# Patient Record
Sex: MALE | Race: WHITE | Employment: OTHER | URBAN - METROPOLITAN AREA
[De-identification: names, ages, dates, MRNs, and addresses within clinical notes are randomized per-mention and may not be internally consistent; named-entity substitution may affect disease eponyms.]

---

## 2018-11-16 ENCOUNTER — HOSPITAL ENCOUNTER (OUTPATIENT)
Dept: PHARMACY | Age: 68
Setting detail: THERAPIES SERIES
Discharge: HOME | End: 2018-11-16
Payer: MEDICARE

## 2018-11-16 DIAGNOSIS — I26.99 OTHER PULMONARY EMBOLISM WITHOUT ACUTE COR PULMONALE, UNSPECIFIED CHRONICITY (HCC): ICD-10-CM

## 2018-11-16 LAB
INR BLD: 5.1
PROTIME: 61.2 SECONDS

## 2018-11-16 PROCEDURE — 85610 PROTHROMBIN TIME: CPT

## 2018-11-16 PROCEDURE — 99213 OFFICE O/P EST LOW 20 MIN: CPT

## 2018-11-16 NOTE — PROGRESS NOTES
First visit to [facility] Office. Education provided on indication and mechanism of warfarin, compliance, appropriate follow-up & monitoring, dietary and medication interactions, potential thromboembolic & bleeding complications, when to seek medical care, and office policy. Patient acknowledges working in consult agreement with pharmacist as referred by his/her physician. Patient states he has been alternating 7.5mg and 5mg most of the time. Recently his INR was low so Dr had him take 10mg daily which resulted in INR 4.2 two Friday ago. He has since resumed normal dosing but did note having significant alcohol since the last INR result and now. He notes he typically drinking 2-3 martinits on the weekend when he goes out but is not a daily drinker. He also cooks a lot at home and is aware of the high vitamin K containing foods. No bleeding or thromboembolic side effects noted. No significant recent illness or disease state changes. PT/INR done in office per protocol. INR is 5.1 which is supratherapeutic. Warfarin regimen will be held for 2 days then continue 5mg daily. Will retest in 5 days to assess. Will look to dose patient on weekly regimen (instead of alternating) and patient is agreeable. Patient understands dosing directions and information discussed. Dosing schedule and follow up appointment given to patient. Progress note routed to referring physicians office.

## 2018-11-21 ENCOUNTER — HOSPITAL ENCOUNTER (OUTPATIENT)
Dept: PHARMACY | Age: 68
Setting detail: THERAPIES SERIES
Discharge: HOME | End: 2018-11-21
Payer: MEDICARE

## 2018-11-21 DIAGNOSIS — I26.99 OTHER PULMONARY EMBOLISM WITHOUT ACUTE COR PULMONALE, UNSPECIFIED CHRONICITY (HCC): ICD-10-CM

## 2018-11-21 LAB
INR BLD: 2
PROTIME: 24.1 SECONDS

## 2018-11-21 PROCEDURE — 85610 PROTHROMBIN TIME: CPT

## 2018-11-21 PROCEDURE — 99211 OFF/OP EST MAY X REQ PHY/QHP: CPT

## 2018-11-28 ENCOUNTER — HOSPITAL ENCOUNTER (OUTPATIENT)
Dept: PHARMACY | Age: 68
Setting detail: THERAPIES SERIES
Discharge: HOME | End: 2018-11-28
Payer: MEDICARE

## 2018-11-28 DIAGNOSIS — I26.99 OTHER PULMONARY EMBOLISM WITHOUT ACUTE COR PULMONALE, UNSPECIFIED CHRONICITY (HCC): ICD-10-CM

## 2018-11-28 LAB
INR BLD: 3.1
PROTIME: 37 SECONDS

## 2018-11-28 PROCEDURE — 85610 PROTHROMBIN TIME: CPT

## 2018-11-28 PROCEDURE — 99211 OFF/OP EST MAY X REQ PHY/QHP: CPT

## 2018-12-05 ENCOUNTER — HOSPITAL ENCOUNTER (OUTPATIENT)
Dept: PHARMACY | Age: 68
Setting detail: THERAPIES SERIES
Discharge: HOME | End: 2018-12-05
Payer: MEDICARE

## 2018-12-05 DIAGNOSIS — I26.99 OTHER PULMONARY EMBOLISM WITHOUT ACUTE COR PULMONALE, UNSPECIFIED CHRONICITY (HCC): ICD-10-CM

## 2018-12-05 LAB
INR BLD: 4.4
PROTIME: 53.2 SECONDS

## 2018-12-05 PROCEDURE — 85610 PROTHROMBIN TIME: CPT

## 2018-12-05 PROCEDURE — 99212 OFFICE O/P EST SF 10 MIN: CPT

## 2018-12-12 ENCOUNTER — HOSPITAL ENCOUNTER (OUTPATIENT)
Dept: PHARMACY | Age: 68
Setting detail: THERAPIES SERIES
Discharge: HOME | End: 2018-12-12
Payer: MEDICARE

## 2018-12-12 DIAGNOSIS — I26.99 OTHER PULMONARY EMBOLISM WITHOUT ACUTE COR PULMONALE, UNSPECIFIED CHRONICITY (HCC): ICD-10-CM

## 2018-12-12 LAB
INR BLD: 2.9
PROTIME: 34.4 SECONDS

## 2018-12-12 PROCEDURE — 85610 PROTHROMBIN TIME: CPT

## 2018-12-12 PROCEDURE — 99211 OFF/OP EST MAY X REQ PHY/QHP: CPT

## 2018-12-12 RX ORDER — ALFUZOSIN HYDROCHLORIDE 10 MG/1
10 TABLET, EXTENDED RELEASE ORAL DAILY
COMMUNITY

## 2018-12-12 RX ORDER — ATORVASTATIN CALCIUM 40 MG/1
40 TABLET, FILM COATED ORAL DAILY
COMMUNITY
Start: 2017-09-01

## 2018-12-12 RX ORDER — ALPRAZOLAM 0.5 MG/1
0.5 TABLET ORAL DAILY
COMMUNITY

## 2018-12-12 RX ORDER — ALLOPURINOL 300 MG/1
300 TABLET ORAL DAILY
COMMUNITY

## 2018-12-12 RX ORDER — WARFARIN SODIUM 5 MG/1
5 TABLET ORAL DAILY
COMMUNITY

## 2018-12-19 ENCOUNTER — HOSPITAL ENCOUNTER (OUTPATIENT)
Dept: PHARMACY | Age: 68
Setting detail: THERAPIES SERIES
Discharge: HOME | End: 2018-12-19
Payer: MEDICARE

## 2018-12-19 DIAGNOSIS — I26.99 OTHER PULMONARY EMBOLISM WITHOUT ACUTE COR PULMONALE, UNSPECIFIED CHRONICITY (HCC): ICD-10-CM

## 2018-12-19 LAB
INR BLD: 2.4
PROTIME: 28.6 SECONDS

## 2018-12-19 PROCEDURE — 85610 PROTHROMBIN TIME: CPT

## 2018-12-19 PROCEDURE — 99211 OFF/OP EST MAY X REQ PHY/QHP: CPT

## 2018-12-19 RX ORDER — BUPROPION HYDROCHLORIDE 150 MG/1
150 TABLET ORAL EVERY MORNING
COMMUNITY

## 2018-12-19 RX ORDER — FENOFIBRATE 145 MG/1
145 TABLET, COATED ORAL DAILY
COMMUNITY

## 2018-12-19 RX ORDER — ASPIRIN 81 MG/1
81 TABLET ORAL DAILY
COMMUNITY

## 2018-12-19 RX ORDER — GABAPENTIN 800 MG/1
800 TABLET ORAL 2 TIMES DAILY
COMMUNITY

## 2018-12-19 RX ORDER — MEMANTINE HYDROCHLORIDE 14 MG/1
14 CAPSULE, EXTENDED RELEASE ORAL DAILY
COMMUNITY

## 2018-12-19 RX ORDER — ZOLPIDEM TARTRATE 5 MG/1
5 TABLET ORAL NIGHTLY PRN
COMMUNITY
End: 2019-01-09 | Stop reason: ALTCHOICE

## 2018-12-19 RX ORDER — FINASTERIDE 5 MG/1
5 TABLET, FILM COATED ORAL DAILY
COMMUNITY

## 2018-12-19 RX ORDER — MAGNESIUM OXIDE 400 MG/1
400 TABLET ORAL DAILY
COMMUNITY

## 2018-12-19 RX ORDER — LISINOPRIL 10 MG/1
10 TABLET ORAL DAILY
COMMUNITY
End: 2019-01-09 | Stop reason: ALTCHOICE

## 2018-12-19 RX ORDER — THIAMINE MONONITRATE (VIT B1) 100 MG
100 TABLET ORAL DAILY
COMMUNITY

## 2018-12-19 RX ORDER — ISOSORBIDE DINITRATE 10 MG/1
10 TABLET ORAL DAILY
COMMUNITY

## 2018-12-19 RX ORDER — PANTOPRAZOLE SODIUM 40 MG/1
40 TABLET, DELAYED RELEASE ORAL 2 TIMES DAILY
COMMUNITY

## 2018-12-19 RX ORDER — M-VIT,TX,IRON,MINS/CALC/FOLIC 27MG-0.4MG
1 TABLET ORAL DAILY
COMMUNITY
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
## 2019-01-02 ENCOUNTER — HOSPITAL ENCOUNTER (OUTPATIENT)
Dept: PHARMACY | Age: 69
Setting detail: THERAPIES SERIES
Discharge: HOME | End: 2019-01-02
Payer: MEDICARE

## 2019-01-02 DIAGNOSIS — I26.99 OTHER PULMONARY EMBOLISM WITHOUT ACUTE COR PULMONALE, UNSPECIFIED CHRONICITY (HCC): ICD-10-CM

## 2019-01-02 LAB
INR BLD: 1.3
PROTIME: 15.9 SECONDS

## 2019-01-02 PROCEDURE — 99212 OFFICE O/P EST SF 10 MIN: CPT

## 2019-01-02 PROCEDURE — 85610 PROTHROMBIN TIME: CPT

## 2019-01-04 ENCOUNTER — APPOINTMENT (OUTPATIENT)
Dept: PHARMACY | Age: 69
End: 2019-01-04
Payer: MEDICARE

## 2019-01-09 ENCOUNTER — HOSPITAL ENCOUNTER (OUTPATIENT)
Dept: PHARMACY | Age: 69
Setting detail: THERAPIES SERIES
Discharge: HOME | End: 2019-01-09
Payer: MEDICARE

## 2019-01-09 DIAGNOSIS — I26.99 OTHER PULMONARY EMBOLISM WITHOUT ACUTE COR PULMONALE, UNSPECIFIED CHRONICITY (HCC): ICD-10-CM

## 2019-01-09 LAB
INR BLD: 2.4
PROTIME: 29.3 SECONDS

## 2019-01-09 PROCEDURE — 85610 PROTHROMBIN TIME: CPT

## 2019-01-09 PROCEDURE — 99211 OFF/OP EST MAY X REQ PHY/QHP: CPT

## 2019-01-09 RX ORDER — DICLOFENAC SODIUM 75 MG/1
75 TABLET, DELAYED RELEASE ORAL 2 TIMES DAILY
COMMUNITY
End: 2019-01-16 | Stop reason: ALTCHOICE

## 2019-01-09 RX ORDER — RIVASTIGMINE TARTRATE 1.5 MG/1
1.5 CAPSULE ORAL 2 TIMES DAILY
COMMUNITY

## 2019-01-09 RX ORDER — BACLOFEN 10 MG/1
10 TABLET ORAL 3 TIMES DAILY PRN
COMMUNITY
End: 2019-01-16 | Stop reason: ALTCHOICE

## 2019-01-09 RX ORDER — PREDNISONE 1 MG/1
5 TABLET ORAL SEE ADMIN INSTRUCTIONS
COMMUNITY
End: 2019-01-16 | Stop reason: ALTCHOICE

## 2019-01-16 ENCOUNTER — HOSPITAL ENCOUNTER (OUTPATIENT)
Dept: PHARMACY | Age: 69
Setting detail: THERAPIES SERIES
Discharge: HOME | End: 2019-01-16
Payer: MEDICARE

## 2019-01-16 DIAGNOSIS — I26.99 OTHER PULMONARY EMBOLISM WITHOUT ACUTE COR PULMONALE, UNSPECIFIED CHRONICITY (HCC): ICD-10-CM

## 2019-01-16 LAB
INR BLD: 3
PROTIME: 36.6 SECONDS

## 2019-01-16 PROCEDURE — 99211 OFF/OP EST MAY X REQ PHY/QHP: CPT

## 2019-01-16 PROCEDURE — 85610 PROTHROMBIN TIME: CPT

## 2019-01-23 ENCOUNTER — HOSPITAL ENCOUNTER (OUTPATIENT)
Dept: PHARMACY | Age: 69
Setting detail: THERAPIES SERIES
Discharge: HOME | End: 2019-01-23
Payer: MEDICARE

## 2019-01-23 DIAGNOSIS — I26.99 OTHER PULMONARY EMBOLISM WITHOUT ACUTE COR PULMONALE, UNSPECIFIED CHRONICITY (HCC): ICD-10-CM

## 2019-01-23 LAB
INR BLD: 3.3
PROTIME: 39 SECONDS

## 2019-01-23 PROCEDURE — 99212 OFFICE O/P EST SF 10 MIN: CPT

## 2019-01-23 PROCEDURE — 85610 PROTHROMBIN TIME: CPT

## 2019-01-30 ENCOUNTER — APPOINTMENT (OUTPATIENT)
Dept: PHARMACY | Age: 69
End: 2019-01-30
Payer: MEDICARE

## 2019-01-30 ENCOUNTER — TELEPHONE (OUTPATIENT)
Dept: PHARMACY | Age: 69
End: 2019-01-30

## 2019-01-30 DIAGNOSIS — I26.99 OTHER PULMONARY EMBOLISM WITHOUT ACUTE COR PULMONALE, UNSPECIFIED CHRONICITY (HCC): ICD-10-CM

## 2019-09-13 PROBLEM — I26.99 PULMONARY EMBOLISM (HCC): Status: RESOLVED | Noted: 2018-11-16 | Resolved: 2019-09-13

## 2020-09-14 ENCOUNTER — HOSPITAL ENCOUNTER (EMERGENCY)
Facility: CLINIC | Age: 70
Discharge: HOME | End: 2020-09-14
Attending: EMERGENCY MEDICINE
Payer: MEDICARE

## 2020-09-14 VITALS
WEIGHT: 170 LBS | TEMPERATURE: 97.8 F | HEART RATE: 72 BPM | OXYGEN SATURATION: 97 % | SYSTOLIC BLOOD PRESSURE: 133 MMHG | DIASTOLIC BLOOD PRESSURE: 72 MMHG | RESPIRATION RATE: 16 BRPM

## 2020-09-14 PROCEDURE — 99282 EMERGENCY DEPT VISIT SF MDM: CPT

## 2020-09-14 ASSESSMENT — PAIN DESCRIPTION - LOCATION: LOCATION: EAR

## 2020-09-14 ASSESSMENT — PAIN DESCRIPTION - ORIENTATION: ORIENTATION: LEFT

## 2020-09-14 ASSESSMENT — PAIN DESCRIPTION - PAIN TYPE: TYPE: ACUTE PAIN

## 2020-09-14 ASSESSMENT — PAIN SCALES - GENERAL: PAINLEVEL_OUTOF10: 3

## 2020-09-16 ASSESSMENT — ENCOUNTER SYMPTOMS
COUGH: 0
SHORTNESS OF BREATH: 0

## 2020-09-16 NOTE — ED PROVIDER NOTES
tablet Take 81 mg by mouth dailyHistorical Med      vitamin D (CHOLECALCIFEROL) 1000 UNIT TABS tablet Take 1,000 Units by mouth dailyHistorical Med      fenofibrate (TRICOR) 145 MG tablet Take 145 mg by mouth dailyHistorical Med      finasteride (PROSCAR) 5 MG tablet Take 5 mg by mouth dailyHistorical Med      gabapentin (NEURONTIN) 800 MG tablet Take 800 mg by mouth 2 times daily. Delwyn Scarce Historical Med      isosorbide dinitrate (ISORDIL) 10 MG tablet Take 10 mg by mouth dailyHistorical Med      magnesium oxide (MAG-OX) 400 MG tablet Take 400 mg by mouth daily Historical Med      pantoprazole (PROTONIX) 40 MG tablet Take 40 mg by mouth 2 times daily Historical Med      sertraline (ZOLOFT) 50 MG tablet Take 75 mg by mouth dailyHistorical Med      vitamin B-1 (THIAMINE) 100 MG tablet Take 100 mg by mouth dailyHistorical Med      buPROPion (WELLBUTRIN XL) 150 MG extended release tablet Take 150 mg by mouth every morning Historical Med      Multiple Vitamins-Minerals (PRESERVISION AREDS 2+MULTI VIT PO) Take 1 tablet by mouth 2 times dailyHistorical Med      Multiple Vitamins-Minerals (THERAPEUTIC MULTIVITAMIN-MINERALS) tablet Take 1 tablet by mouth dailyHistorical Med      alfuzosin (UROXATRAL) 10 MG extended release tablet Take 10 mg by mouth dailyHistorical Med      allopurinol (ZYLOPRIM) 300 MG tablet Take 300 mg by mouth dailyHistorical Med      ALPRAZolam (XANAX) 0.5 MG tablet Take 0.5 mg by mouth daily. Delwyn Scarce Historical Med      atorvastatin (LIPITOR) 40 MG tablet Take 40 mg by mouth daily Historical Med      warfarin (COUMADIN) 5 MG tablet Take 5 mg by mouth daily Take one tablet by mouth once daily as directed by [facility]. See anticoagulation episode for current dosing instructions. Historical Med             ALLERGIES     Duricef [cefadroxil]    FAMILY HISTORY     History reviewed. No pertinent family history.        SOCIAL HISTORY       Social History     Socioeconomic History    Marital status:      Spouse name: None    Number of children: None    Years of education: None    Highest education level: None   Occupational History    None   Social Needs    Financial resource strain: None    Food insecurity     Worry: None     Inability: None    Transportation needs     Medical: None     Non-medical: None   Tobacco Use    Smoking status: Never Smoker   Substance and Sexual Activity    Alcohol use: Yes     Comment: socially     Drug use: Never    Sexual activity: None   Lifestyle    Physical activity     Days per week: None     Minutes per session: None    Stress: None   Relationships    Social connections     Talks on phone: None     Gets together: None     Attends [employer] service: None     Active member of club or organization: None     Attends meetings of clubs or organizations: None     Relationship status: None    Intimate partner violence     Fear of current or ex partner: None     Emotionally abused: None     Physically abused: None     Forced sexual activity: None   Other Topics Concern    None   Social History Narrative    None       SCREENINGS             PHYSICAL EXAM    (up to 7 for level 4, 8 or more for level 5)     ED Triage Vitals   BP Temp Temp Source Pulse Resp SpO2 Height Weight   09/14/20 1645 09/14/20 1641 09/14/20 1641 09/14/20 1641 09/14/20 1641 09/14/20 1641 -- 09/14/20 1641   133/72 97.8 °F (36.6 °C) Oral 72 16 97 %  170 lb (77.1 kg)       Physical Exam  Vitals signs reviewed. Constitutional:       General: He is not in acute distress. HENT:      Head: Atraumatic. Ears:      Comments: Patient has a small abrasion in the midportion of the left ear overlying the antihelix which is oozing blood when cleaned. Lacerations are present that would require suture repair. The size of this area of abrasion is less than 1/2 cm in length. There is no underlying tenderness. Skin:     General: Skin is warm and dry. Neurological:      Mental Status: He is alert. DIAGNOSTIC RESULTS     EKG: All EKG's are interpreted by the Emergency Department Physician who either signs orCo-signs this chart in the absence of a cardiologist.    RADIOLOGY:   Non-plain film images such as CT, Ultrasound and MRI are read by the radiologist. Plain radiographic images are visualized and preliminarily interpreted by the emergency physician with the below findings:    Interpretation per the Radiologist below, ifavailable at the time of this note:    No orders to display         ED BEDSIDE ULTRASOUND:   Performed by ED Physician - none    LABS:  Labs Reviewed - No data to display    All other labs were within normal range ornot returned as of this dictation. EMERGENCY DEPARTMENT COURSE and DIFFERENTIAL DIAGNOSIS/MDM:   Vitals:    Vitals:    09/14/20 1641 09/14/20 1645   BP:  133/72   Pulse: 72    Resp: 16    Temp: 97.8 °F (36.6 °C)    TempSrc: Oral    SpO2: 97%    Weight: 77.1 kg (170 lb)             Patient's ear wound is clean with chlorhexidine, the area dried and then Dermabond applied on the surface of the abrasion which has effectively stopped bleeding. MDM    CONSULTS:  None    PROCEDURES:  Unlessotherwise noted below, none     Procedures    FINAL IMPRESSION      1. Abrasion of antitragus of left ear, initial encounter          DISPOSITION/PLAN   DISPOSITION Decision To Discharge 09/14/2020 05:38:09 PM      PATIENT REFERRED TO:  [facility]  BALWINDER/ Mauricio   [phone]    As needed      DISCHARGE MEDICATIONS:         Problem List:  Patient Active Problem List   Diagnosis Code   (none) - all problems resolved or deleted           Summation      Patient Course: Discharged.     ED Medicationsadministered this visit:  Medications - No data to display    New Prescriptions from this visit:    Discharge Medication List as of 9/14/2020  5:38 PM          Follow-up:  [facility]  [address] [place] [address],[facility] [address]  [phone]    As needed

## 2023-07-12 ENCOUNTER — HOSPITAL ENCOUNTER (EMERGENCY)
Facility: CLINIC | Age: 73
Discharge: HOME | End: 2023-07-12
Attending: EMERGENCY MEDICINE
Payer: MEDICARE

## 2023-07-12 VITALS
HEART RATE: 63 BPM | OXYGEN SATURATION: 96 % | SYSTOLIC BLOOD PRESSURE: 115 MMHG | WEIGHT: 120 LBS | BODY MASS INDEX: 20.49 KG/M2 | TEMPERATURE: 97.9 F | RESPIRATION RATE: 16 BRPM | HEIGHT: 64 IN | DIASTOLIC BLOOD PRESSURE: 63 MMHG

## 2023-07-12 DIAGNOSIS — S61.451A CAT BITE OF RIGHT HAND, INITIAL ENCOUNTER: Primary | ICD-10-CM
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
## 2023-07-12 PROCEDURE — 6360000002 HC RX W HCPCS

## 2023-07-12 PROCEDURE — 90471 IMMUNIZATION ADMIN: CPT

## 2023-07-12 PROCEDURE — 99284 EMERGENCY DEPT VISIT MOD MDM: CPT

## 2023-07-12 PROCEDURE — 6370000000 HC RX 637 (ALT 250 FOR IP)

## 2023-07-12 PROCEDURE — 90715 TDAP VACCINE 7 YRS/> IM: CPT

## 2023-07-12 RX ORDER — AMOXICILLIN AND CLAVULANATE POTASSIUM 875; 125 MG/1; MG/1
1 TABLET, FILM COATED ORAL 2 TIMES DAILY
Qty: 20 TABLET | Refills: 0 | Status: SHIPPED | OUTPATIENT
Start: 2023-07-12 | End: 2023-07-22

## 2023-07-12 RX ORDER — AMOXICILLIN AND CLAVULANATE POTASSIUM 875; 125 MG/1; MG/1
1 TABLET, FILM COATED ORAL ONCE
Status: COMPLETED | OUTPATIENT
Start: 2023-07-12 | End: 2023-07-12

## 2023-07-12 RX ADMIN — AMOXICILLIN AND CLAVULANATE POTASSIUM 1 TABLET: 875; 125 TABLET, FILM COATED ORAL at 19:11

## 2023-07-12 RX ADMIN — TETANUS TOXOID, REDUCED DIPHTHERIA TOXOID AND ACELLULAR PERTUSSIS VACCINE, ADSORBED 0.5 ML: 5; 2.5; 8; 8; 2.5 SUSPENSION INTRAMUSCULAR at 19:12

## 2023-07-12 ASSESSMENT — LIFESTYLE VARIABLES
HOW MANY STANDARD DRINKS CONTAINING ALCOHOL DO YOU HAVE ON A TYPICAL DAY: PATIENT DOES NOT DRINK
HOW OFTEN DO YOU HAVE A DRINK CONTAINING ALCOHOL: NEVER

## 2023-07-12 ASSESSMENT — PAIN SCALES - GENERAL: PAINLEVEL_OUTOF10: 8

## 2023-07-12 NOTE — ED PROVIDER NOTES
[facility]  [address]  Phone: [phone]      Pt Name: Marita Jones  MRN: [id number]  [address] [facility] 1950  Date of evaluation: 7/12/23    CHIEF COMPLAINT       Chief Complaint   Patient presents with    Animal Bite     Right hand        PAST MEDICAL HISTORY    has a past medical history of Acid reflux, Hyperlipidemia, Hypertension, and Pulmonary embolism ([address]). SURGICAL HISTORY      has a past surgical history that includes sigmoidectomy and Cholecystectomy. CURRENT MEDICATIONS       Previous Medications    ALFUZOSIN (UROXATRAL) 10 MG EXTENDED RELEASE TABLET    Take 10 mg by mouth daily    ALLOPURINOL (ZYLOPRIM) 300 MG TABLET    Take 300 mg by mouth daily    ALPRAZOLAM (XANAX) 0.5 MG TABLET    Take 0.5 mg by mouth daily. .    ASPIRIN 81 MG EC TABLET    Take 81 mg by mouth daily    ATORVASTATIN (LIPITOR) 40 MG TABLET    Take 40 mg by mouth daily     BUPROPION (WELLBUTRIN XL) 150 MG EXTENDED RELEASE TABLET    Take 150 mg by mouth every morning     FENOFIBRATE (TRICOR) 145 MG TABLET    Take 145 mg by mouth daily    FINASTERIDE (PROSCAR) 5 MG TABLET    Take 5 mg by mouth daily    GABAPENTIN (NEURONTIN) 800 MG TABLET    Take 800 mg by mouth 2 times daily. .    ISOSORBIDE DINITRATE (ISORDIL) 10 MG TABLET    Take 10 mg by mouth daily    MAGNESIUM OXIDE (MAG-OX) 400 MG TABLET    Take 400 mg by mouth daily     MEMANTINE ER (NAMENDA XR) 14 MG CP24 EXTENDED RELEASE CAPSULE    Take 14 mg by mouth daily    MULTIPLE VITAMINS-MINERALS (PRESERVISION AREDS 2+MULTI VIT PO)    Take 1 tablet by mouth 2 times daily    MULTIPLE VITAMINS-MINERALS (THERAPEUTIC MULTIVITAMIN-MINERALS) TABLET    Take 1 tablet by mouth daily    PANTOPRAZOLE (PROTONIX) 40 MG TABLET    Take 40 mg by mouth 2 times daily     RIVASTIGMINE (EXELON) 1.5 MG CAPSULE    Take 1.5 mg by mouth 2 times daily    SERTRALINE (ZOLOFT) 50 MG TABLET    Take 75 mg by mouth daily    VITAMIN B-1 (THIAMINE) 100 MG TABLET    Take 100 mg by
Historical Provider, MD       REVIEW OF SYSTEMS  (2-9 systems for level 4, 10 ormore for level 5)      Review of Systems  See HPI. PHYSICAL EXAM  (up to 7 for level 4, 8 or more for level 5)      INITIAL VITALS:  vitals were not taken for this visit. Vital signs reviewed. Physical Exam  Constitutional:       Appearance: Normal appearance. HENT:      Head: Normocephalic. Right Ear: External ear normal.      Left Ear: External ear normal.   Eyes:      Pupils: Pupils are equal, round, and reactive to light. Cardiovascular:      Rate and Rhythm: Normal rate and regular rhythm. Pulmonary:      Effort: Pulmonary effort is normal.      Breath sounds: Normal breath sounds. Abdominal:      General: Bowel sounds are normal.      Palpations: Abdomen is soft. Musculoskeletal:         General: Normal range of motion. Cervical back: Normal range of motion and neck supple. Skin:     General: Skin is warm and dry. Findings: Wound present. Comments: cat bite wound to the right palm with surrounding erythema, heat and tenderness. No streaking up the arm no pus drainage no bony tenderness   Neurological:      General: No focal deficit present. Mental Status: He is alert. Psychiatric:         Mood and Affect: Mood normal.         DIFFERENTIAL DIAGNOSIS / MDM     Differential diagnosis: Cat bite, cellulitis  Patient was bit by his house cat there is low risk or concern for rabies. He is not up-to-date on his tetanus so that vaccine was updated. He is already showing signs of infection he will be placed on Augmentin and given wound instructions. He should make an appointment in a few days for wound recheck if his condition worsens he should return to the ER    PLAN (LABS / IMAGING / EKG):  No orders of the defined types were placed in this encounter.       MEDICATIONS ORDERED:  Orders Placed This Encounter   Medications    amoxicillin-clavulanate (AUGMENTIN) 875-125 MG per tablet

## 2023-07-12 NOTE — DISCHARGE INSTRUCTIONS
Keep wound clean and dry. Take antibiotics you may apply ice for pain relief. Watch for worsening infection (increased swelling and redness, red streaks up the arm, pus drainage, fever/chills) if worse return to the ER for iv antibiotics.